# Patient Record
Sex: FEMALE | Race: WHITE | HISPANIC OR LATINO
[De-identification: names, ages, dates, MRNs, and addresses within clinical notes are randomized per-mention and may not be internally consistent; named-entity substitution may affect disease eponyms.]

---

## 2021-05-03 ENCOUNTER — DASHBOARD ENCOUNTERS (OUTPATIENT)
Age: 61
End: 2021-05-03

## 2021-05-03 ENCOUNTER — OFFICE VISIT (OUTPATIENT)
Dept: URBAN - METROPOLITAN AREA CLINIC 78 | Facility: CLINIC | Age: 61
End: 2021-05-03
Payer: COMMERCIAL

## 2021-05-03 VITALS
WEIGHT: 120 LBS | HEIGHT: 64 IN | SYSTOLIC BLOOD PRESSURE: 104 MMHG | BODY MASS INDEX: 20.49 KG/M2 | RESPIRATION RATE: 16 BRPM | DIASTOLIC BLOOD PRESSURE: 62 MMHG | TEMPERATURE: 98.2 F | HEART RATE: 86 BPM

## 2021-05-03 DIAGNOSIS — R10.13 EPIGASTRIC BURNING SENSATION: ICD-10-CM

## 2021-05-03 DIAGNOSIS — Z86.19 H/O HELICOBACTER INFECTION: ICD-10-CM

## 2021-05-03 DIAGNOSIS — K29.90 GASTRITIS AND DUODENITIS: ICD-10-CM

## 2021-05-03 DIAGNOSIS — R12 HEARTBURN: ICD-10-CM

## 2021-05-03 DIAGNOSIS — M81.0 OSTEOPOROSIS, UNSPECIFIED OSTEOPOROSIS TYPE, UNSPECIFIED PATHOLOGICAL FRACTURE PRESENCE: ICD-10-CM

## 2021-05-03 DIAGNOSIS — K31.89 INTESTINAL METAPLASIA OF GASTRIC MUCOSA: ICD-10-CM

## 2021-05-03 PROBLEM — 196731005: Status: ACTIVE | Noted: 2021-05-03

## 2021-05-03 PROBLEM — 276661002 AGE-RELATED OSTEOPOROSIS: Status: ACTIVE | Noted: 2021-05-03

## 2021-05-03 PROCEDURE — 99204 OFFICE O/P NEW MOD 45 MIN: CPT | Performed by: INTERNAL MEDICINE

## 2021-05-03 RX ORDER — SUCRALFATE 1 G/1
1 TABLET ON AN EMPTY STOMACH TABLET ORAL
Qty: 60 | Refills: 1 | OUTPATIENT
Start: 2021-05-03 | End: 2021-07-02

## 2021-05-03 RX ORDER — ESOMEPRAZOLE MAGNESIUM 40 MG/1
1 CAPSULE 30 MINUTES BEFORE BREAKFAST AND 30 MINUTES BEFORE DINNER CAPSULE, DELAYED RELEASE ORAL BID
Qty: 60 | Refills: 2 | OUTPATIENT
Start: 2021-05-03

## 2021-05-03 NOTE — HPI-TODAY'S VISIT:
The patient comes in today for a second opinion. A copy of this note will be sent to the referring physician.  The patient has never had a colonoscopy previously. There is no FH of colon cancer or colon polyps.   There is no recent history of rectal bleeding. The patient has no pertinent additional complaints of constipation, diarrhea, bloating, rectal pain or unintentional weight loss. She has occasional lower abdominal pain.   Regarding any upper GI complaints, the patient has not had heartburn, nausea, vomiting or dysphagia.  She admits to heartburn and epgiastric burning. She has a metallic taste in her mouth and halitosis.  She was initially treated with Dexilant and Sucralfate around Oct 2020. She felt better initially but then felt it worsened her symptoms and she was switched to Pepcid.  She also reports mild anorexia. She has lost a minimal amount of weight (~2 lbs).   They are concerned about her using PPIs longterm since she has underlying osteoporosis.   She has been watching carefully what she eats.   The patient does not take blood thinners.   She is originally from [place]. She is accompanied by her daughtr today.   Summary of prior workup: - Labs on 3/15/21: WBC 5.7, Hb 14.8, MCV 90, Plts 219. Gluc 84, BUN 11, Cr 0.5, Na 143, normal liver enzymes. TSH 1.6. Vit D 25. Hep C Ab neg.   - EGD on 10/17/20: Duodenal mucosa with mild chronic inflammation. Focal GIM. Colonic mucosa with mild acute inflammation.  - Colonoscopy by Dr. Kartik Ibrahim on 10/16/20: Normal TI and cecum, localized sigm erythema, IH's.  - Labs on 1/3/20: Neg celiac panel. H pylori breath test negative. - EGD on 11/8/19: Duodenal biopsies with preserved villous architecture and mild patchy intraepithelial lymphocytes. + H pylorio gastritis. Mild reflux esophagitis. Prox esoph benign squamous papilloma.

## 2021-05-03 NOTE — PHYSICAL EXAM GASTROINTESTINAL
Abdomen , soft, mild periumbilical tenderness, nondistended , no guarding or rigidity , no masses palpable , normal bowel sounds , Liver and Spleen , no hepatomegaly present , no hepatosplenomegaly , liver nontender , spleen not palpable

## 2021-05-05 ENCOUNTER — TELEPHONE ENCOUNTER (OUTPATIENT)
Dept: URBAN - METROPOLITAN AREA CLINIC 92 | Facility: CLINIC | Age: 61
End: 2021-05-05

## 2021-05-05 RX ORDER — SUCRALFATE 1 G/10ML
10 ML SUSPENSION ORAL
Qty: 600 MILLILITER | Refills: 2 | OUTPATIENT
Start: 2021-05-07 | End: 2021-08-05

## 2021-05-05 RX ORDER — ESOMEPRAZOLE MAGNESIUM 40 MG/1
1 CAPSULE 30 MINUTES BEFORE BREAKFAST AND 30 MINUTES BEFORE DINNER CAPSULE, DELAYED RELEASE ORAL BID
Qty: 60 | Refills: 2
Start: 2021-05-03

## 2021-05-11 LAB
ANTIPARIETAL CELL ANTIBODY: 12.8
GASTRIN, SERUM: 69
INTRINSIC FACTOR ABS, SERUM: 1
VITAMIN B12: 336

## 2021-06-01 ENCOUNTER — OFFICE VISIT (OUTPATIENT)
Dept: URBAN - METROPOLITAN AREA CLINIC 78 | Facility: CLINIC | Age: 61
End: 2021-06-01

## 2021-06-01 RX ORDER — ESOMEPRAZOLE MAGNESIUM 40 MG/1
1 CAPSULE 30 MINUTES BEFORE BREAKFAST AND 30 MINUTES BEFORE DINNER CAPSULE, DELAYED RELEASE ORAL BID
Qty: 60 | Refills: 2 | Status: ACTIVE | COMMUNITY
Start: 2021-05-03

## 2021-06-01 RX ORDER — SUCRALFATE 1 G/10ML
10 ML SUSPENSION ORAL
Qty: 600 MILLILITER | Refills: 2 | Status: ACTIVE | COMMUNITY
Start: 2021-05-07 | End: 2021-08-05

## 2021-06-01 RX ORDER — SUCRALFATE 1 G/1
1 TABLET ON AN EMPTY STOMACH TABLET ORAL
Qty: 60 | Refills: 1 | Status: ACTIVE | COMMUNITY
Start: 2021-05-03 | End: 2021-07-02

## 2021-06-01 NOTE — HPI-TODAY'S VISIT:
The patient comes in today for a second opinion. A copy of this note will be sent to the referring physician.    There is no recent history of rectal bleeding. The patient has no pertinent additional complaints of constipation, diarrhea, bloating, rectal pain or unintentional weight loss. She has occasional lower abdominal pain.   Regarding any upper GI complaints, the patient has not had heartburn, nausea, vomiting or dysphagia.  She admits to heartburn and epgiastric burning. She has a metallic taste in her mouth and halitosis.  She was initially treated with Dexilant and Sucralfate around Oct 2020. She felt better initially but then felt it worsened her symptoms and she was switched to Pepcid.  She also reports mild anorexia. She has lost a minimal amount of weight (~2 lbs).   They are concerned about her using PPIs longterm since she has underlying osteoporosis.   She has been watching carefully what she eats.   The patient does not take blood thinners.   There is no FH of colon cancer or colon polyps.  She is originally from [place]. She is accompanied by her daughter today.   Summary of prior workup: - Labs on 3/15/21: WBC 5.7, Hb 14.8, MCV 90, Plts 219. Gluc 84, BUN 11, Cr 0.5, Na 143, normal liver enzymes. TSH 1.6. Vit D 25. Hep C Ab neg.   - EGD on 10/17/20: Duodenal mucosa with mild chronic inflammation. Focal GIM. Colonic mucosa with mild acute inflammation.  - Colonoscopy by Dr. Kartik Ibrahim on 10/16/20: Normal TI and cecum, localized sigm erythema, IH's.  - Labs on 1/3/20: Neg celiac panel. H pylori breath test negative. - EGD on 11/8/19: Duodenal biopsies with preserved villous architecture and mild patchy intraepithelial lymphocytes. + H pylorio gastritis. Mild reflux esophagitis. Prox esoph benign squamous papilloma.

## 2021-08-06 ENCOUNTER — OFFICE VISIT (OUTPATIENT)
Dept: URBAN - METROPOLITAN AREA CLINIC 78 | Facility: CLINIC | Age: 61
End: 2021-08-06

## 2021-09-27 ENCOUNTER — OFFICE VISIT (OUTPATIENT)
Dept: URBAN - METROPOLITAN AREA CLINIC 78 | Facility: CLINIC | Age: 61
End: 2021-09-27